# Patient Record
(demographics unavailable — no encounter records)

---

## 2025-05-13 NOTE — HISTORY OF PRESENT ILLNESS
[FreeTextEntry1] : 46 yo male, born in [place], referred for evaluation of hepatic steatosis. NO family hx of liver disease besides ? fatty liver in mother. Pt aslready exercising regularly , walking and doing mild resistance traning. Lost 15 lbs since January. Currently 203.  Labs from Dr. Kay hcv neg afp 1.0 bsag neg bsab pos. Sonogram with Steatosis and MRI liver at [facility]  4/5/25- confirms steatosis and focal fatty sparing No sob or cp. Drinks occ etoh 2- 3 x a week, recently cut down. Works in [employer]. REviewed labs with pt, sonogram of abdomen and MRI liver.

## 2025-05-13 NOTE — ASSESSMENT
[FreeTextEntry1] : 46 yo male, born in [place], referred for evaluation of hepatic steatosis. NO family hx of liver disease besides ? fatty liver in mother. Pt aslready exercising regularly , walking and doing mild resistance traning. Lost 15 lbs since January. Currently 203.  Labs from Dr. Kay hcv neg afp 1.0 bsag neg bsab pos. Sonogram with Steatosis and MRI liver at [facility]  4/5/25- confirms steatosis and focal fatty sparing No sob or cp. Drinks occ etoh 2- 3 x a week, recently cut down. Works in IT. REviewed labs with pt, sonogram of abdomen and MRI liver.  IMP: hepatic steatosis focal fatty sparing DM obesity PLAN: 1. I spoke with the patient at length regarding fatty liver disease (Hepatic Steatosis). I have reviewed the spectrum of disease (NAFDL and nonalcoholic steatohepatitis or MÁRQUEZ)  as well as the risk of disease progression to  cirrhosis and its complications. I have also explained that patients with fatty liver disease may develop liver cancer without cirrhosis and therefore should be screened yearly with an abdominal ultrasound. I have explained that fatty liver disease is commonly seen in patients with diabetes or those who are overweight. I have explained that fatty liver disease may also be a precursor to the development of diabetes as it is part of the metabolic syndrome. We reviewed the treatment of fatty liver disease and explained that the best therapy is diet and exercise. The diet should be a "healthy heart diet, " low in fatty foods or the Mediterranean diet. Alcohol should  be avoided. Furthermore, I explained that weight loss, 5-10% of body weight, may lead to improvement in the underlying liver disease state. 2. Maintain weight loss 3. RTO 6months with fibroscan at next visit

## 2025-05-13 NOTE — PHYSICAL EXAM
